# Patient Record
Sex: FEMALE | Race: WHITE | NOT HISPANIC OR LATINO | URBAN - METROPOLITAN AREA
[De-identification: names, ages, dates, MRNs, and addresses within clinical notes are randomized per-mention and may not be internally consistent; named-entity substitution may affect disease eponyms.]

---

## 2020-01-31 ENCOUNTER — TELEPHONE (OUTPATIENT)
Dept: DERMATOLOGY | Facility: CLINIC | Age: 20
End: 2020-01-31

## 2020-01-31 NOTE — TELEPHONE ENCOUNTER
[facility] Call Center    Phone Message    May a detailed message be left on voicemail: yes    Reason for Call: Other: pt mom calling , daughter has rash on chest please call pt     Action Taken: Message routed to:  Adult Clinics: Dermatology p [phone]

## 2021-08-20 ENCOUNTER — OFFICE VISIT (OUTPATIENT)
Dept: OTOLARYNGOLOGY | Facility: CLINIC | Age: 21
End: 2021-08-20
Payer: COMMERCIAL

## 2021-08-20 VITALS — SYSTOLIC BLOOD PRESSURE: 111 MMHG | DIASTOLIC BLOOD PRESSURE: 73 MMHG | OXYGEN SATURATION: 99 % | HEART RATE: 76 BPM

## 2021-08-20 DIAGNOSIS — J03.91 RECURRENT TONSILLITIS: Primary | ICD-10-CM

## 2021-08-20 DIAGNOSIS — R05.9 COUGH: ICD-10-CM

## 2021-08-20 DIAGNOSIS — H65.92 OME (OTITIS MEDIA WITH EFFUSION), LEFT: ICD-10-CM

## 2021-08-20 PROCEDURE — U0005 INFEC AGEN DETEC AMPLI PROBE: HCPCS | Performed by: OTOLARYNGOLOGY

## 2021-08-20 PROCEDURE — U0003 INFECTIOUS AGENT DETECTION BY NUCLEIC ACID (DNA OR RNA); SEVERE ACUTE RESPIRATORY SYNDROME CORONAVIRUS 2 (SARS-COV-2) (CORONAVIRUS DISEASE [COVID-19]), AMPLIFIED PROBE TECHNIQUE, MAKING USE OF HIGH THROUGHPUT TECHNOLOGIES AS DESCRIBED BY CMS-2020-01-R: HCPCS | Performed by: OTOLARYNGOLOGY

## 2021-08-20 PROCEDURE — 99203 OFFICE O/P NEW LOW 30 MIN: CPT | Performed by: OTOLARYNGOLOGY

## 2021-08-20 RX ORDER — METHYLPREDNISOLONE 4 MG
TABLET, DOSE PACK ORAL
Qty: 21 TABLET | Refills: 0 | Status: SHIPPED | OUTPATIENT
Start: 2021-08-20

## 2021-08-20 RX ORDER — CLINDAMYCIN PHOSPHATE 10 UG/ML
LOTION TOPICAL
COMMUNITY
Start: 2021-01-05

## 2021-08-20 RX ORDER — NORETHINDRONE ACETATE AND ETHINYL ESTRADIOL .02; 1 MG/1; MG/1
1 TABLET ORAL
COMMUNITY
Start: 2021-07-29

## 2021-08-20 NOTE — PROGRESS NOTES
Chief Complaint - tonsillitis    History of Present Illness - Geetha Mcgee is a 20 year old female with recurrent acute tonsillitis. The patient describes bouts of significant sore throat with swelling of the tonsils. This has happened a few times this summer. A week ago she got sick for the third time. She hasn't had a lot of symptoms of tonsillitis in the past before this. Currently she has some fatigue. Has some congestion. It is really painful to swallow. She did have mono earlier this summer. The patient's strep test from 5/26/21 and 6/27/21 is negative. covid on those dates negative. Mono on 6/27 screen was positive.     Past Medical History - healthy    Allergies - sulfa    Social History -   Social History     Socioeconomic History     Marital status: Single     Spouse name: Not on file     Number of children: Not on file     Years of education: Not on file     Highest education level: Not on file   Occupational History     Not on file   Tobacco Use     Smoking status: Not on file   Substance and Sexual Activity     Alcohol use: Not on file     Drug use: Not on file     Sexual activity: Not on file   Other Topics Concern     Not on file   Social History Narrative     Not on file     Social Determinants of Health     Financial Resource Strain:      Difficulty of Paying Living Expenses:    Food Insecurity:      Worried About Running Out of Food in the Last Year:      Ran Out of Food in the Last Year:    Transportation Needs:      Lack of Transportation (Medical):      Lack of Transportation (Non-Medical):    Physical Activity:      Days of Exercise per Week:      Minutes of Exercise per Session:    Stress:      Feeling of Stress :    Social Connections:      Frequency of Communication with Friends and Family:      Frequency of Social Gatherings with Friends and Family:      Attends Mandaeism Services:      Active Member of Clubs or Organizations:      Attends Club or Organization Meetings:      Marital  Status:    Intimate Partner Violence:      Fear of Current or Ex-Partner:      Emotionally Abused:      Physically Abused:      Sexually Abused:    nonsmoker    Family History - no tonsil problems in family    Review of Systems - As per HPI and PMHx, no fever, otherwise 7 system review of the head and neck is negative.    Physical Exam  /73   Pulse 76   SpO2 99%   General - The patient is in no distress.  Alert and oriented, answers questions and cooperates with examination appropriately.   Voice and Breathing - The patient was breathing comfortably without the use of accessory muscles. There was no wheezing, stridor, or stertor.  The patients voice was clear and strong.  Eyes - Extraocular movements intact. Sclera were not icteric or injected, conjunctiva were pink and moist.  Neurologic - Cranial nerves II-XII are grossly intact. Specifically, the facial nerve is intact, House-Brackmann grade 1 of 6.   Nose - No significant external deformity.  Nasal mucosa is pink and moist with no abnormal mucus.  The septum was midline, turbinates are of normal size and position.  No polyps, masses, or purulence.  Mouth - Examination of the oral cavity showed pink, healthy oral mucosa. No lesions or ulcerations noted.  The tongue was mobile and protrudes midline.  Oropharynx - The walls of the oropharynx were smooth, pink, moist, symmetric, and had no lesions or ulcerations.  The tonsils were 2-3+, but no exudate. Posterior oropharyngeal wall had a lot more erythema and postnasal drainage.   Ears - The auricles appeared normal. The external auditory canals were nonedematous and nonerythematous. Left tympanic membrane intact, but left otitis media with effusion. Right tympanic membrane intact. No effusion.   Neck -  Soft, non-tender. Palpable bilateral level 2 lymphadenopathy.     A/P - Geetha Mcgee is a 20 year old female with recurrent tonsillitis, left otitis media with effusion. She has a cough, and therefore  I recommend COVID-19 test. I will treat with augmentin and a medrol dose pack. This could be sinusitis or adenoiditis given the postnasal drainage. It may also be different illnesses, but she has had symptoms about 3 times this summer. If this keeps happening, return. valsalva and use decongestants for the left OME.      Miller Greene MD  Otolaryngology  [facility]

## 2021-08-20 NOTE — LETTER
8/20/2021         RE: Geetha Mcgee  [id number] [facility] [id number]        Dear Colleague,    Thank you for referring your patient, Geetha Mcgee, to the [facility]. Please see a copy of my visit note below.    Chief Complaint - tonsillitis    History of Present Illness - Geetha Mcgee is a 20 year old female with recurrent acute tonsillitis. The patient describes bouts of significant sore throat with swelling of the tonsils. This has happened a few times this summer. A week ago she got sick for the third time. She hasn't had a lot of symptoms of tonsillitis in the past before this. Currently she has some fatigue. Has some congestion. It is really painful to swallow. She did have mono earlier this summer. The patient's strep test from 5/26/21 and 6/27/21 is negative. covid on those dates negative. Mono on 6/27 screen was positive.     Past Medical History - healthy    Allergies - sulfa    Social History -   Social History     Socioeconomic History     Marital status: Single     Spouse name: Not on file     Number of children: Not on file     Years of education: Not on file     Highest education level: Not on file   Occupational History     Not on file   Tobacco Use     Smoking status: Not on file   Substance and Sexual Activity     Alcohol use: Not on file     Drug use: Not on file     Sexual activity: Not on file   Other Topics Concern     Not on file   Social History Narrative     Not on file     Social Determinants of Health     Financial Resource Strain:      Difficulty of Paying Living Expenses:    Food Insecurity:      Worried About Running Out of Food in the Last Year:      Ran Out of Food in the Last Year:    Transportation Needs:      Lack of Transportation (Medical):      Lack of Transportation (Non-Medical):    Physical Activity:      Days of Exercise per Week:      Minutes of Exercise per Session:    Stress:      Feeling of Stress :    Social  Connections:      Frequency of Communication with Friends and Family:      Frequency of Social Gatherings with Friends and Family:      Attends Mandaeism Services:      Active Member of Clubs or Organizations:      Attends Club or Organization Meetings:      Marital Status:    Intimate Partner Violence:      Fear of Current or Ex-Partner:      Emotionally Abused:      Physically Abused:      Sexually Abused:    nonsmoker    Family History - no tonsil problems in family    Review of Systems - As per HPI and PMHx, no fever, otherwise 7 system review of the head and neck is negative.    Physical Exam  /73   Pulse 76   SpO2 99%   General - The patient is in no distress.  Alert and oriented, answers questions and cooperates with examination appropriately.   Voice and Breathing - The patient was breathing comfortably without the use of accessory muscles. There was no wheezing, stridor, or stertor.  The patients voice was clear and strong.  Eyes - Extraocular movements intact. Sclera were not icteric or injected, conjunctiva were pink and moist.  Neurologic - Cranial nerves II-XII are grossly intact. Specifically, the facial nerve is intact, House-Brackmann grade 1 of 6.   Nose - No significant external deformity.  Nasal mucosa is pink and moist with no abnormal mucus.  The septum was midline, turbinates are of normal size and position.  No polyps, masses, or purulence.  Mouth - Examination of the oral cavity showed pink, healthy oral mucosa. No lesions or ulcerations noted.  The tongue was mobile and protrudes midline.  Oropharynx - The walls of the oropharynx were smooth, pink, moist, symmetric, and had no lesions or ulcerations.  The tonsils were 2-3+, but no exudate. Posterior oropharyngeal wall had a lot more erythema and postnasal drainage.   Ears - The auricles appeared normal. The external auditory canals were nonedematous and nonerythematous. Left tympanic membrane intact, but left otitis media with  effusion. Right tympanic membrane intact. No effusion.   Neck -  Soft, non-tender. Palpable bilateral level 2 lymphadenopathy.     A/P - Geetha Mcgee is a 20 year old female with recurrent tonsillitis, left otitis media with effusion. She has a cough, and therefore I recommend COVID-19 test. I will treat with augmentin and a medrol dose pack. This could be sinusitis or adenoiditis given the postnasal drainage. It may also be different illnesses, but she has had symptoms about 3 times this summer. If this keeps happening, return. valsalva and use decongestants for the left OME.      Miller Greene MD  Otolaryngology  [facility]              Again, thank you for allowing me to participate in the care of your patient.        Sincerely,        Miller Greene MD

## 2021-08-21 LAB — SARS-COV-2 RNA RESP QL NAA+PROBE: NEGATIVE

## 2021-09-18 ENCOUNTER — HEALTH MAINTENANCE LETTER (OUTPATIENT)
Age: 21
End: 2021-09-18

## 2022-11-20 ENCOUNTER — HEALTH MAINTENANCE LETTER (OUTPATIENT)
Age: 22
End: 2022-11-20

## 2024-01-28 ENCOUNTER — HEALTH MAINTENANCE LETTER (OUTPATIENT)
Age: 24
End: 2024-01-28

## 2024-10-31 ENCOUNTER — OFFICE VISIT (OUTPATIENT)
Dept: URGENT CARE | Facility: URGENT CARE | Age: 24
End: 2024-10-31
Payer: COMMERCIAL

## 2024-10-31 VITALS
HEIGHT: 70 IN | BODY MASS INDEX: 23.62 KG/M2 | TEMPERATURE: 97.9 F | DIASTOLIC BLOOD PRESSURE: 80 MMHG | HEART RATE: 78 BPM | SYSTOLIC BLOOD PRESSURE: 113 MMHG | WEIGHT: 165 LBS | OXYGEN SATURATION: 97 %

## 2024-10-31 DIAGNOSIS — R07.0 THROAT PAIN: Primary | ICD-10-CM

## 2024-10-31 LAB
DEPRECATED S PYO AG THROAT QL EIA: NEGATIVE
GROUP A STREP BY PCR: NOT DETECTED

## 2024-10-31 PROCEDURE — 99203 OFFICE O/P NEW LOW 30 MIN: CPT | Performed by: FAMILY MEDICINE

## 2024-10-31 PROCEDURE — 87651 STREP A DNA AMP PROBE: CPT | Performed by: FAMILY MEDICINE

## 2024-10-31 PROCEDURE — 87635 SARS-COV-2 COVID-19 AMP PRB: CPT | Performed by: FAMILY MEDICINE

## 2024-10-31 RX ORDER — IBUPROFEN 200 MG
200 TABLET ORAL EVERY 4 HOURS PRN
COMMUNITY

## 2024-10-31 ASSESSMENT — PAIN SCALES - GENERAL: PAINLEVEL_OUTOF10: SEVERE PAIN (6)

## 2024-10-31 NOTE — PATIENT INSTRUCTIONS
Use the tylenol 500mg-1000mg up to three times daily along with 200-400mg of ibuprofen.     Chloraseptic may help.     Gargling with warm to hot salt water may also help your sore throat.

## 2024-10-31 NOTE — PROGRESS NOTES
"(R07.0) Throat pain  (primary encounter diagnosis)  Comment:  viral uri related is most likely.   Plan: Streptococcus A Rapid Screen w/Reflex to PCR -         Clinic Collect, Group A Streptococcus PCR         Throat Swab, Symptomatic COVID-19 Virus         (Coronavirus) by PCR Nose          likley viral Symptomatic therapy and follow up discussed Use of OTC  meds. discussed   -------------------------------  Geetha Mcgee with presents with 4 days symptoms including sore throat, congestion, achiness, low grade fevers. Minimal cough.     Treatment measures tried include Tylenol/Ibuprofen and Decongestants.  Exposures--no  Recent travel--[place]    Current Outpatient Medications   Medication Sig Dispense Refill    dextromethorphan (TUSSIN COUGH) 15 MG/5ML syrup Take 10 mLs by mouth 4 times daily as needed for cough.      ibuprofen (ADVIL/MOTRIN) 200 MG tablet Take 200 mg by mouth every 4 hours as needed for pain.      norethindrone-ethinyl estradiol (MICROGESTIN 1/20) 1-20 MG-MCG tablet Take 1 tablet by mouth      amoxicillin-clavulanate (AUGMENTIN) 875-125 MG tablet Take 1 tablet by mouth 2 times daily (Patient not taking: Reported on 10/31/2024) 20 tablet 0    clindamycin (CLEOCIN T) 1 % external lotion  (Patient not taking: Reported on 10/31/2024)      methylPREDNISolone (MEDROL DOSEPAK) 4 MG tablet therapy pack Follow Package Directions (Patient not taking: Reported on 10/31/2024) 21 tablet 0       ROS otherwise negative for resp., ID,  HEENT symptoms.    Objective: /80   Pulse 78   Temp 97.9  F (36.6  C) (Temporal)   Ht 1.778 m (5' 10\")   Wt 74.8 kg (165 lb)   LMP 10/29/2024   SpO2 97%   BMI 23.68 kg/m    Exam:  GENERAL APPEARANCE: healthy, alert and no distress  EYES: Eyes grossly normal to inspection  HENT: ear canals and TM's normal, nose and mouth without ulcers or lesions, tonsillar erythema, and Oropharynx erythematous. No exudate   NECK: anterior cervical adenopathy, no asymmetry, " masses, or scars and thyroid normal to palpation  RESP: lungs clear to auscultation - no rales, rhonchi or wheezes  CV: regular rates and rhythm, no murmur    Results for orders placed or performed in visit on 10/31/24   Streptococcus A Rapid Screen w/Reflex to PCR - Clinic Collect     Status: Normal    Specimen: Throat; Swab   Result Value Ref Range    Group A Strep antigen Negative Negative        Results for orders placed or performed in visit on 10/31/24   Streptococcus A Rapid Screen w/Reflex to PCR - Clinic Collect     Status: Normal    Specimen: Throat; Swab   Result Value Ref Range    Group A Strep antigen Negative Negative

## 2024-11-01 LAB — SARS-COV-2 RNA RESP QL NAA+PROBE: NEGATIVE

## 2025-02-02 ENCOUNTER — HEALTH MAINTENANCE LETTER (OUTPATIENT)
Age: 25
End: 2025-02-02